# Patient Record
(demographics unavailable — no encounter records)

---

## 2025-07-08 NOTE — PLAN
[FreeTextEntry1] : UA and C&S sent to lab due to abnormal dip stick test in the office. Mammo/sono Rx provided,  All questions answered, patient encouraged to reach out to our office with any new concerns.  RTO in 1 year for annual exam or sooner PRN.

## 2025-07-08 NOTE — HISTORY OF PRESENT ILLNESS
[FreeTextEntry1] : Patient presents today for routine annual visit.  She is pleased with OCP, it regulated her menses and the flow is normal,   Family Hx of breast and ovarian ca, patient already had genetic testing done, results are negative for mutations. She has no new GYN concerns.   [LMPDate] : 7/1/25